# Patient Record
Sex: MALE | Race: WHITE | NOT HISPANIC OR LATINO | Employment: FULL TIME | URBAN - METROPOLITAN AREA
[De-identification: names, ages, dates, MRNs, and addresses within clinical notes are randomized per-mention and may not be internally consistent; named-entity substitution may affect disease eponyms.]

---

## 2023-02-23 LAB
ALANINE AMINOTRANSFERASE (SGPT) (U/L) IN SER/PLAS: 26 U/L (ref 10–52)
ALBUMIN (G/DL) IN SER/PLAS: 4.7 G/DL (ref 3.4–5)
ALKALINE PHOSPHATASE (U/L) IN SER/PLAS: 55 U/L (ref 33–120)
ANION GAP IN SER/PLAS: 11 MMOL/L (ref 10–20)
ASPARTATE AMINOTRANSFERASE (SGOT) (U/L) IN SER/PLAS: 28 U/L (ref 9–39)
BILIRUBIN TOTAL (MG/DL) IN SER/PLAS: 0.6 MG/DL (ref 0–1.2)
CALCIUM (MG/DL) IN SER/PLAS: 9.9 MG/DL (ref 8.6–10.3)
CARBON DIOXIDE, TOTAL (MMOL/L) IN SER/PLAS: 28 MMOL/L (ref 21–32)
CHLORIDE (MMOL/L) IN SER/PLAS: 104 MMOL/L (ref 98–107)
CREATININE (MG/DL) IN SER/PLAS: 1.04 MG/DL (ref 0.5–1.3)
ERYTHROCYTE DISTRIBUTION WIDTH (RATIO) BY AUTOMATED COUNT: 12.4 % (ref 11.5–14.5)
ERYTHROCYTE MEAN CORPUSCULAR HEMOGLOBIN CONCENTRATION (G/DL) BY AUTOMATED: 33.5 G/DL (ref 32–36)
ERYTHROCYTE MEAN CORPUSCULAR VOLUME (FL) BY AUTOMATED COUNT: 91 FL (ref 80–100)
ERYTHROCYTES (10*6/UL) IN BLOOD BY AUTOMATED COUNT: 4.31 X10E12/L (ref 4.5–5.9)
GFR MALE: >90 ML/MIN/1.73M2
GLUCOSE (MG/DL) IN SER/PLAS: 91 MG/DL (ref 74–99)
HEMATOCRIT (%) IN BLOOD BY AUTOMATED COUNT: 39.4 % (ref 41–52)
HEMOGLOBIN (G/DL) IN BLOOD: 13.2 G/DL (ref 13.5–17.5)
LEUKOCYTES (10*3/UL) IN BLOOD BY AUTOMATED COUNT: 11 X10E9/L (ref 4.4–11.3)
PLATELETS (10*3/UL) IN BLOOD AUTOMATED COUNT: 264 X10E9/L (ref 150–450)
POTASSIUM (MMOL/L) IN SER/PLAS: 4.1 MMOL/L (ref 3.5–5.3)
PROTEIN TOTAL: 7.3 G/DL (ref 6.4–8.2)
SODIUM (MMOL/L) IN SER/PLAS: 139 MMOL/L (ref 136–145)
UREA NITROGEN (MG/DL) IN SER/PLAS: 19 MG/DL (ref 6–23)

## 2024-08-05 ENCOUNTER — CLINICAL SUPPORT (OUTPATIENT)
Dept: AUDIOLOGY | Facility: CLINIC | Age: 44
End: 2024-08-05
Payer: COMMERCIAL

## 2024-08-05 ENCOUNTER — APPOINTMENT (OUTPATIENT)
Dept: OTOLARYNGOLOGY | Facility: CLINIC | Age: 44
End: 2024-08-05
Payer: COMMERCIAL

## 2024-08-05 DIAGNOSIS — H93.8X3 EAR PRESSURE, BILATERAL: ICD-10-CM

## 2024-08-05 DIAGNOSIS — H93.13 TINNITUS OF BOTH EARS: Primary | ICD-10-CM

## 2024-08-05 PROCEDURE — 92557 COMPREHENSIVE HEARING TEST: CPT | Performed by: AUDIOLOGIST

## 2024-08-05 PROCEDURE — 92550 TYMPANOMETRY & REFLEX THRESH: CPT | Performed by: AUDIOLOGIST

## 2024-08-05 ASSESSMENT — PAIN - FUNCTIONAL ASSESSMENT: PAIN_FUNCTIONAL_ASSESSMENT: 0-10

## 2024-08-05 ASSESSMENT — ENCOUNTER SYMPTOMS: OCCASIONAL FEELINGS OF UNSTEADINESS: 0

## 2024-08-05 ASSESSMENT — PAIN SCALES - GENERAL: PAINLEVEL_OUTOF10: 0 - NO PAIN

## 2024-08-05 NOTE — PROGRESS NOTES
AUDIOLOGY ADULT AUDIOMETRIC EVALUATION      Name:  Mynor Mello  :  1980  Age:  44 y.o.  Date of Evaluation: 24    History:  Reason for visit:  Mr. Mynor Mello was seen today as part of the visit with Junior Patrick D.O. for an evaluation of hearing.   Chief Complaint   Patient presents with    Tinnitus    Ear Pressure      Reported this past January he noticed a sudden onset of bilateral non-pulsatile tinnitus. Reported the tinnitus has been constant and may fluctuate in volume. Stated since the initial onset, the tinnitus may have decreased slightly in volume. Reported the tinnitus appears to be in each ear, however, it has been slightly louder in the right ear. Denied any difficulty sleeping or communicating due to the tinnitus. Denied any negative thoughts, depression or anxiety due to the tinnitus, but reported a history of depression.   Stated he has noticed the tinnitus feels like there is a pressure sensation inside his head and almost a desire to pop his ear.   Denied any difficulty hearing or communicating. 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   Denied any dizziness/vertigo, ear surgery, recent ear/sinus infections, recent falls, sinus/throat concerns, ear drainage, or sudden hearing loss.    EVALUATION     See Audiogram    RESULTS:    Otoscopic Evaluation:   Right Ear: Otoscopy revealed a clear healthy canal and a healthy tympanic membrane was visualized.   Left Ear: Otoscopy revealed a clear healthy canal and a healthy tympanic membrane was visualized.     Immittance:  Immittance Measures: 226 Hz   Right Ear: Tympanometric testing revealed a normal type A tympanogram with normal middle ear pressure and normal static compliance.  Left Ear: Tympanometric testing revealed a normal type A tympanogram with normal middle ear pressure and normal static compliance.    Right Ear: Ipsilateral acoustic reflexes were present  at, 500-4,000 Hz, at expected sensation levels.  Left Ear: Ipsilateral acoustic reflexes were present at, 500-4,000 Hz, at expected sensation levels.    Test technique:  Pure Tone Audiometry via insert earphones    Reliability:   excellent    Pure Tone Audiometry:    Right Ear: Audiometric testing indicated normal peripheral hearing sensitivity through 4,000 Hz, sloping to a mild sensorineural hearing loss at 6,000 Hz, with normal hearing above.   Left Ear:   Audiometric testing indicated normal peripheral hearing sensitivity through 4,000 Hz, with near normal peripheral hearing sensitivity above.       Speech Audiometry:   Right Ear:  Speech Reception Threshold (SRT) was obtained at 10 dBHL                  Word Recognition scores were excellent (100%) in quiet when words were presented at 50 dBHL  Left Ear:  Speech Reception Threshold (SRT) was obtained at  10 dBHL                  Word Recognition scores were excellent (100%) in quiet when words were presented at 50 dBHL  Testing was performed with recorded NU-6 speech words in quiet. Speech thresholds were in good agreement with the pure tone averages in each ear.     Distortion Product Otoacoustic Emissions:        Right Ear: Present from 1,500-6,000 Hz.         Left Ear:  Present from 2,000-6,000 Hz.  Present OAEs suggest normal cochlear outer hair cell function.  Absent OAEs are consistent with some degree of hearing loss.    IMPRESSIONS:  Today's test results are  consistent with essentially normal peripheral hearing sensitivity in each ear .  The patient was counseled with regard to the findings.    RECOMMENDATIONS:  * Continue medical follow up with Junior Patrick D.O.  * Retest as medically indicated, or sooner if a change in hearing sensitivity or tinnitus is noticed.   * Wear hearing protection while in the presence of loud sounds.   * Use tinnitus coping strategies as needed, such as sound apps on a smart phone, utilizing calming noise in the room,  running a fan at night, etc.     PATIENT EDUCATION:   Discussed results and recommendations with the patient and a copy of the hearing test was provided.  Questions were addressed and the patient was encouraged to contact our department should concerns arise.  The patient was seen from  10:30-11:00 am.

## 2024-08-06 ENCOUNTER — OFFICE VISIT (OUTPATIENT)
Dept: OTOLARYNGOLOGY | Facility: CLINIC | Age: 44
End: 2024-08-06
Payer: COMMERCIAL

## 2024-08-06 VITALS
SYSTOLIC BLOOD PRESSURE: 136 MMHG | TEMPERATURE: 97.6 F | HEIGHT: 66 IN | BODY MASS INDEX: 28 KG/M2 | DIASTOLIC BLOOD PRESSURE: 95 MMHG | WEIGHT: 174.2 LBS

## 2024-08-06 DIAGNOSIS — H90.3 BILATERAL HIGH FREQUENCY SENSORINEURAL HEARING LOSS: Primary | ICD-10-CM

## 2024-08-06 DIAGNOSIS — H93.13 TINNITUS OF BOTH EARS: ICD-10-CM

## 2024-08-06 PROCEDURE — 99203 OFFICE O/P NEW LOW 30 MIN: CPT | Performed by: OTOLARYNGOLOGY

## 2024-08-06 PROCEDURE — 1036F TOBACCO NON-USER: CPT | Performed by: OTOLARYNGOLOGY

## 2024-08-06 PROCEDURE — 3008F BODY MASS INDEX DOCD: CPT | Performed by: OTOLARYNGOLOGY

## 2024-08-06 NOTE — PROGRESS NOTES
"Impression:  1. Bilateral high frequency sensorineural hearing loss        2. Tinnitus of both ears             RECOMMENDATIONS/PLAN :  I reassured the patient that medically his ears look excellent and both tympanic membranes are moving normally.  We discussed various masking techniques to help cover up the ringing in his ears.  He will protect his hearing from any future loud noise exposure.  We will repeat an audiogram over the next 4 to 5 years or sooner with any sudden changes.      **This electronic medical record note was created with the use of voice recognition software.  Despite proofreading, typographical or grammatical errors may be present that could affect meaning of content **    Subjective   Patient ID:     Mynor Mello is a 44 y.o. male who presents to the office today with a history of a nonpulsating type ringing in the ears.  He denies any history of middle ear infections or drainage from the ears.  No imbalance or vertigo.  No fluctuation in hearing.  He has worked around some loud noises.    ROS:  A detailed 12 system review of systems is noted on the intake form has been reviewed with the patient with details noted in the HPI and scanned into the patient's medical record.    Objective     History reviewed. No pertinent past medical history.     Past Surgical History:   Procedure Laterality Date    OTHER SURGICAL HISTORY  03/14/2022    Liposuction    OTHER SURGICAL HISTORY  03/14/2022    [place] tooth extraction        No Known Allergies     No current outpatient medications on file.     Tobacco Use: Low Risk  (8/6/2024)    Patient History     Smoking Tobacco Use: Never     Smokeless Tobacco Use: Never     Passive Exposure: Not on file        Alcohol Use: Not on file        Social History     Substance and Sexual Activity   Drug Use Not on file        Physical Exam:  Visit Vitals  BP (!) 136/95   Temp 36.4 °C (97.6 °F) (Temporal)   Ht 1.676 m (5' 6\")   Wt 79 kg (174 lb 3.2 oz)   BMI 28.12 kg/m² "   Smoking Status Never   BSA 1.92 m²      General: Patient is alert, oriented, cooperative in no apparent distress.  Head: Normocephalic, atraumatic.  Eyes: PERRL, EOMI, Conjunctiva is clear. No nystagmus.  Ears: Right Ear-- Pinna is normal.  External auditory canal is patent. Tympanic membrane is [intact, translucent and has good mobility with my pneumatic otoscope. No effusion].  Mastoid is nontender.  Left ear-- Pinna is normal.  External auditory canal is patent. Tympanic membrane is [intact, translucent and has good mobility with my pneumatic otoscope.  No effusion].  Mastoid is nontender.  Nose: Septum is straight.  No septal perforation or lesions. No septal hematoma/ seroma.  No signs of bleeding.  Inferior turbinates are normal.   No evidence of intranasal polyps.  No infectious drainage.  Throat:  Floor of mouth is clear, no masses.  Tongue appears normal, no lesions or masses. Gums, gingiva, buccal mucosa appear pink and moist, no lesions. Teeth are in good repair.  No obvious dental infections.  Peritonsillar regions appear symmetric without swelling.  Hard and soft palate appear normal, no obvious cleft. Uvula is midline.  Oropharynx: No lesions. Retropharyngeal wall is flat.  No active postnasal drip.  Neck: Supple, [] no lymphadenopathy.  No masses.  Salivary Glands: Symmetric bilaterally.  No palpable masses.  No evidence of acute infection or salivary stones  Neurologic: Cranial Nerves 2-12 are grossly intact without focal deficits. Cerebellar function testing is normal.     Results:   I reviewed his recent audiogram and he does have a mild high-frequency sensorineural loss in both ears.  This is symmetric.  Tympanic membrane's have normal mobility on tympanometry.  Word recognition scores were 100% bilaterally.  Speech reception threshold is 10 dB bilaterally    Procedure:   []    Junior Patrick,

## 2024-10-24 ENCOUNTER — TELEMEDICINE (OUTPATIENT)
Dept: PRIMARY CARE | Facility: CLINIC | Age: 44
End: 2024-10-24
Payer: COMMERCIAL

## 2024-10-24 DIAGNOSIS — R09.89 SYMPTOMS OF UPPER RESPIRATORY INFECTION (URI): Primary | ICD-10-CM

## 2024-10-24 PROCEDURE — 99213 OFFICE O/P EST LOW 20 MIN: CPT | Performed by: NURSE PRACTITIONER

## 2024-10-24 NOTE — PROGRESS NOTES
Subjective   Patient ID: Mynor Mello is a 44 y.o. male who presents for URI.  Began 3 days ago with nasal symptoms and body achesMonday nasal body aches  No fever initially.  Tmax 100 on second day only  Vaxxed  AlkaSeltzer Cold and Flu,    ibuprofen and saline spray, mild relief  No worse.      URI   Associated symptoms include congestion and rhinorrhea. Pertinent negatives include no coughing, headaches, nausea, sore throat or vomiting.       Review of Systems   Constitutional:  Positive for fatigue.   HENT:  Positive for congestion and rhinorrhea. Negative for sore throat and trouble swallowing.    Respiratory:  Negative for cough.    Gastrointestinal:  Negative for nausea and vomiting.   Neurological:  Negative for dizziness and headaches.       Objective   Physical Exam  Constitutional:       General: He is not in acute distress.     Appearance: He is ill-appearing (mildly).   Pulmonary:      Effort: Pulmonary effort is normal.   Musculoskeletal:      Cervical back: Neck supple.   Neurological:      Mental Status: He is oriented to person, place, and time.         Assessment/Plan   Diagnoses and all orders for this visit:  Symptoms of upper respiratory infection (URI)           MAYUR Peacock 10/24/24 10:22 AM

## 2024-11-23 ASSESSMENT — ENCOUNTER SYMPTOMS
VOMITING: 0
SORE THROAT: 0
FATIGUE: 1
COUGH: 0
NAUSEA: 0
TROUBLE SWALLOWING: 0
HEADACHES: 0
RHINORRHEA: 1
DIZZINESS: 0

## 2024-11-23 NOTE — ASSESSMENT & PLAN NOTE
Hx and limited exam are c/w emerging viral URI.  Reviewed typical course versus symptoms to report fo rre-evaluation.  Expected total course 10-14 days.  Continue OTCs, be careful not to duplicate ibuprofen with any present in multi -symptoms meds.  Fluids very important, rest, be sure to keep protein in diet,   Follow with PCP if not beginning ot improve by next week.

## 2025-02-17 ENCOUNTER — TELEMEDICINE (OUTPATIENT)
Dept: PRIMARY CARE | Facility: CLINIC | Age: 45
End: 2025-02-17
Payer: COMMERCIAL

## 2025-02-17 DIAGNOSIS — J06.9 VIRAL URI WITH COUGH: Primary | ICD-10-CM

## 2025-02-17 PROCEDURE — 99213 OFFICE O/P EST LOW 20 MIN: CPT | Performed by: NURSE PRACTITIONER

## 2025-02-17 RX ORDER — BROMPHENIRAMINE MALEATE, PSEUDOEPHEDRINE HYDROCHLORIDE, AND DEXTROMETHORPHAN HYDROBROMIDE 2; 30; 10 MG/5ML; MG/5ML; MG/5ML
10 SYRUP ORAL 4 TIMES DAILY PRN
Qty: 120 ML | Refills: 0 | Status: SHIPPED | OUTPATIENT
Start: 2025-02-17 | End: 2025-02-27

## 2025-02-17 ASSESSMENT — ENCOUNTER SYMPTOMS
ARTHRALGIAS: 0
SINUS PRESSURE: 1
FATIGUE: 0
MYALGIAS: 1
DIARRHEA: 1
APPETITE CHANGE: 0
RHINORRHEA: 1
WHEEZING: 0
NAUSEA: 0
SORE THROAT: 0
VOMITING: 0
HEADACHES: 1
LIGHT-HEADEDNESS: 0
COUGH: 1
DIAPHORESIS: 0
SHORTNESS OF BREATH: 0
ABDOMINAL PAIN: 0
FEVER: 0
ACTIVITY CHANGE: 0
CHILLS: 0
DIZZINESS: 0

## 2025-02-17 NOTE — PATIENT INSTRUCTIONS
Pleasure meeting with you today!    Let me know if you need anything.     Please send me a MyChart message if you have any questions or concerns.  FOR NON URGENT questions only.  Allow up to 72 hours for response.     If you have prescription issues or other questions you can email  Yifan Brand,  Digital Health Coordinator, at  [email or web address]

## 2025-02-17 NOTE — PROGRESS NOTES
Subjective   Patient ID: Mynor Mello is a 44 y.o. male who presents for URI (Sx onset: Sat evening).    Sx onset: sat (2 days ago)    Sx include: nasal/sinus congestion, cough, stye, facial pressure, mild HA, body aches, diarrhea  Denies: fever, sweats, chills, N/V    OTC taking Zicam, ibuprofen   Mild relief    No known ill contacts      URI   Associated symptoms include congestion, coughing, diarrhea, headaches and rhinorrhea. Pertinent negatives include no abdominal pain, chest pain, ear pain, nausea, sore throat, vomiting or wheezing.        Review of Systems   Constitutional:  Negative for activity change, appetite change, chills, diaphoresis, fatigue and fever.   HENT:  Positive for congestion, postnasal drip, rhinorrhea and sinus pressure. Negative for ear pain and sore throat.    Respiratory:  Positive for cough. Negative for shortness of breath and wheezing.    Cardiovascular:  Negative for chest pain.   Gastrointestinal:  Positive for diarrhea. Negative for abdominal pain, nausea and vomiting.   Musculoskeletal:  Positive for myalgias. Negative for arthralgias.   Neurological:  Positive for headaches. Negative for dizziness and light-headedness.       Objective   There were no vitals taken for this visit.    Physical Exam  Constitutional:       General: He is not in acute distress.     Appearance: Normal appearance. He is not ill-appearing.      Comments: On Demand Virtual Visit Patient Consent     An interactive audio and video telecommunication system which permits real time communications between the patient (at the originating site) and provider (at the distant site) was utilized to provide this telehealth service.   Verbal consent was requested and obtained from Mynor Mello (or parent if under 18) on this date, for a telehealth visit.   I have verbally confirmed with Mynor Mello (or parent if under 18) that they are physically located in the [facility] during this virtual visit.    I performed  this visit using realtime telehealth tools, including an audio/video OR telephone connection between the patient listed who was located [place] the [place] and myself, Leandro Lynch CNP (licensed in the [facility]).  At the start of the visit, I introduced myself as Leandro Lynch, Nurse practitioner and verified the patients name, , and current physical location.    If they were currently outside of the state of [place], the visit was ended and the patient was referred to alternative means for evaluation and treatment.   The patient was made aware of the limitations of the telehealth visit.  They will not be physically examined and all issues may not be appropriate for a telehealth visit.  If necessary, an in person referral will be made.       DISCLAIMER:   In preparing for this visit and writing this note, I reviewed previous electronic medical records (labs, imaging and medical charts) available.  Significant findings which helped in decision making are recorded in this encounter charting.     Pulmonary:      Effort: Pulmonary effort is normal.   Neurological:      Mental Status: He is alert and oriented to person, place, and time.         Assessment/Plan   Diagnoses and all orders for this visit:  Viral URI with cough  -     brompheniramine-pseudoeph-DM 2-30-10 mg/5 mL syrup; Take 10 mL by mouth 4 times a day as needed for congestion or cough for up to 10 days.  Advised to test fro COVID and Flu    recommend warm liquids for sore throat, honey 1tsp three times daily may help cough, and Tylenol as needed for pain/fever.  Cool mist humidifier, vapo rubs may also help with congestion  Rest and drink plenty of fluids    Most upper respiratory infection are caused by viruses but sometimes they cause secondary bacterial infections. It can cause cough, congestion, runny nose, sore throat, and fever. You are contagious. Fever medicines can help reduce fever and pain. Virus cannot be cured by an antibiotic. The body's  immune system will fight off the virus. Upper Respiratory Illnesses usually improves in 7 - 10 days, but coughs can last for several weeks. If your symptoms worsen after 10 - 14 days you may have a bacterial infection.    Follow up with PCP as needed  Education provided in writing in MyChart